# Patient Record
Sex: FEMALE | Race: BLACK OR AFRICAN AMERICAN | Employment: PART TIME | URBAN - METROPOLITAN AREA
[De-identification: names, ages, dates, MRNs, and addresses within clinical notes are randomized per-mention and may not be internally consistent; named-entity substitution may affect disease eponyms.]

---

## 2017-01-26 ENCOUNTER — APPOINTMENT (OUTPATIENT)
Dept: GENERAL RADIOLOGY | Age: 47
DRG: 812 | End: 2017-01-26
Attending: EMERGENCY MEDICINE
Payer: COMMERCIAL

## 2017-01-26 ENCOUNTER — HOSPITAL ENCOUNTER (OUTPATIENT)
Age: 47
Setting detail: OBSERVATION
Discharge: HOME | DRG: 812 | End: 2017-01-27
Attending: EMERGENCY MEDICINE | Admitting: FAMILY MEDICINE
Payer: COMMERCIAL

## 2017-01-26 DIAGNOSIS — N92.1 MENOMETRORRHAGIA: ICD-10-CM

## 2017-01-26 DIAGNOSIS — D50.9 HYPOCHROMIC-MICROCYTIC ANEMIA: ICD-10-CM

## 2017-01-26 DIAGNOSIS — R55 SYNCOPE AND COLLAPSE: Primary | ICD-10-CM

## 2017-01-26 PROBLEM — D64.9 ANEMIA: Status: ACTIVE | Noted: 2017-01-26

## 2017-01-26 LAB
ALBUMIN SERPL BCP-MCNC: 3.8 G/DL (ref 3.5–5)
ALBUMIN/GLOB SERPL: 1 {RATIO} (ref 1.1–2.2)
ALP SERPL-CCNC: 59 U/L (ref 45–117)
ALT SERPL-CCNC: 31 U/L (ref 12–78)
ANION GAP BLD CALC-SCNC: 10 MMOL/L (ref 5–15)
APTT PPP: 22.4 SEC (ref 22.1–32.5)
AST SERPL W P-5'-P-CCNC: 13 U/L (ref 15–37)
BILIRUB SERPL-MCNC: 0.2 MG/DL (ref 0.2–1)
BUN SERPL-MCNC: 13 MG/DL (ref 6–20)
BUN/CREAT SERPL: 17 (ref 12–20)
CALCIUM SERPL-MCNC: 8.2 MG/DL (ref 8.5–10.1)
CHLORIDE SERPL-SCNC: 104 MMOL/L (ref 97–108)
CK MB CFR SERPL CALC: NORMAL % (ref 0–2.5)
CK MB SERPL-MCNC: <1 NG/ML (ref 5–25)
CK SERPL-CCNC: 117 U/L (ref 26–192)
CO2 SERPL-SCNC: 24 MMOL/L (ref 21–32)
CREAT SERPL-MCNC: 0.75 MG/DL (ref 0.55–1.02)
D DIMER PPP FEU-MCNC: 0.25 MG/L FEU (ref 0–0.65)
GLOBULIN SER CALC-MCNC: 3.8 G/DL (ref 2–4)
GLUCOSE SERPL-MCNC: 100 MG/DL (ref 65–100)
INR PPP: 1.1 (ref 0.9–1.1)
POTASSIUM SERPL-SCNC: 3.7 MMOL/L (ref 3.5–5.1)
PROT SERPL-MCNC: 7.6 G/DL (ref 6.4–8.2)
PROTHROMBIN TIME: 11.1 SEC (ref 9–11.1)
SODIUM SERPL-SCNC: 138 MMOL/L (ref 136–145)
THERAPEUTIC RANGE,PTTT: NORMAL SECS (ref 58–77)
TROPONIN I SERPL-MCNC: <0.04 NG/ML

## 2017-01-26 PROCEDURE — 82550 ASSAY OF CK (CPK): CPT | Performed by: EMERGENCY MEDICINE

## 2017-01-26 PROCEDURE — 85610 PROTHROMBIN TIME: CPT | Performed by: EMERGENCY MEDICINE

## 2017-01-26 PROCEDURE — 99285 EMERGENCY DEPT VISIT HI MDM: CPT

## 2017-01-26 PROCEDURE — 94762 N-INVAS EAR/PLS OXIMTRY CONT: CPT

## 2017-01-26 PROCEDURE — 86920 COMPATIBILITY TEST SPIN: CPT | Performed by: EMERGENCY MEDICINE

## 2017-01-26 PROCEDURE — 85379 FIBRIN DEGRADATION QUANT: CPT | Performed by: EMERGENCY MEDICINE

## 2017-01-26 PROCEDURE — 85025 COMPLETE CBC W/AUTO DIFF WBC: CPT | Performed by: EMERGENCY MEDICINE

## 2017-01-26 PROCEDURE — 86900 BLOOD TYPING SEROLOGIC ABO: CPT | Performed by: EMERGENCY MEDICINE

## 2017-01-26 PROCEDURE — 80053 COMPREHEN METABOLIC PANEL: CPT | Performed by: EMERGENCY MEDICINE

## 2017-01-26 PROCEDURE — 71020 XR CHEST PA LAT: CPT

## 2017-01-26 PROCEDURE — 85730 THROMBOPLASTIN TIME PARTIAL: CPT | Performed by: EMERGENCY MEDICINE

## 2017-01-26 PROCEDURE — 93005 ELECTROCARDIOGRAM TRACING: CPT

## 2017-01-26 PROCEDURE — 84484 ASSAY OF TROPONIN QUANT: CPT | Performed by: EMERGENCY MEDICINE

## 2017-01-26 PROCEDURE — 36415 COLL VENOUS BLD VENIPUNCTURE: CPT | Performed by: EMERGENCY MEDICINE

## 2017-01-26 PROCEDURE — 99218 HC RM OBSERVATION: CPT

## 2017-01-26 RX ORDER — SODIUM CHLORIDE 9 MG/ML
250 INJECTION, SOLUTION INTRAVENOUS AS NEEDED
Status: DISCONTINUED | OUTPATIENT
Start: 2017-01-26 | End: 2017-01-27 | Stop reason: HOSPADM

## 2017-01-26 RX ORDER — GUAIFENESIN 100 MG/5ML
324 LIQUID (ML) ORAL
COMMUNITY
End: 2017-01-27

## 2017-01-26 RX ORDER — IBUPROFEN 600 MG/1
600 TABLET ORAL
COMMUNITY
End: 2017-01-27

## 2017-01-26 NOTE — Clinical Note
Patient Class[de-identified] Observation [654] Type of Bed: Telemetry Remote [29] Reason for Observation: anemia Admitting Physician: Yannick Banuelos Attending Physician: Desiree Cordon[address] Diagnosis: anemia

## 2017-01-26 NOTE — IP AVS SNAPSHOT
[address] 
[phone] Patient: Tg Smart MRN: [id number] :1970 You are allergic to the following No active allergies Recent Documentation Height Weight BMI Smoking Status 1.6 m 59 kg 23.03 kg/m2 Never Smoker Unresulted Labs Order Current Status TYPE & CROSSMATCH Preliminary result Emergency Contacts Name Discharge Info Relation Home Work Mobile [facility]     [phone] About your hospitalization You were admitted on:  2017 You last received care in the:  [facility] 4M POST SURG ORT 1 You were discharged on:  2017 Unit phone number:  [phone] Why you were hospitalized Your primary diagnosis was:  Not on File Your diagnoses also included:  Microcytic Anemia, Metrorrhagia, Syncope, Anemia Providers Seen During Your Hospitalizations Provider Role Specialty Primary office phone Gina Kendall MD Attending Provider Emergency Medicine [phone] Nathan Perales MD Attending Provider [facility] [phone] Your Primary Care Physician (PCP) Primary Care Physician Office Phone Office Fax [facility] [phone] Follow-up Information Follow up With Details Comments Contact Info George Currie MD   [address] 
[phone] Current Discharge Medication List  
  
START taking these medications Dose & Instructions Dispensing Information Comments Morning Noon Evening Bedtime  
 ferrous sulfate 325 mg (65 mg iron) tablet Commonly known as:  Iron (Ferrous Sulfate) Your next dose is: Today, Tomorrow Other:  _________ Dose:  325 mg Take 1 Tab by mouth two (2) times a day. Quantity:  60 Tab Refills:  2 STOP taking these medications   
 aspirin 81 mg chewable tablet  
   
  
 ibuprofen 600 mg tablet Commonly known as:  MOTRIN Where to Get Your Medications Information on where to get these meds will be given to you by the nurse or doctor. ! Ask your nurse or doctor about these medications  
  ferrous sulfate 325 mg (65 mg iron) tablet Discharge Instructions Patient Discharge Instructions Shashi Dickson / [id number] : 1970 Admitted 2017 Discharged: 2017 1:50 PM  
 
ACUTE DIAGNOSES: 
anemia Microcytic anemia Anemia CHRONIC MEDICAL DIAGNOSES: 
Problem List as of 2017  Date Reviewed: 2017 Codes Class Noted - Resolved Anemia ICD-10-CM: D64.9 ICD-9-CM: 285.9  2017 - Present Microcytic anemia ICD-10-CM: D50.9 ICD-9-CM: 280.9  2017 - Present Metrorrhagia ICD-10-CM: N92.1 ICD-9-CM: 626.6  2017 - Present Syncope ICD-10-CM: R55 
ICD-9-CM: 780.2  2017 - Present DISCHARGE MEDICATIONS:  
 
 
 
· It is important that you take the medication exactly as they are prescribed. · Keep your medication in the bottles provided by the pharmacist and keep a list of the medication names, dosages, and times to be taken in your wallet. · Do not take other medications without consulting your doctor. DIET:  Regular Diet ACTIVITY: Activity as tolerated ADDITIONAL INFORMATION: If you experience any of the following symptoms then please call your primary care physician or return to the emergency room if you cannot get hold of your doctor: Fever, chills, nausea, vomiting, diarrhea, change in mentation, falling, bleeding, shortness of breath. FOLLOW UP CARE: 
Dr. Nataliya Edwards MD  you are to call and set up an appointment to see them with in 1 week. Follow-up with specialists at directed by them Information obtained by : 
 I understand that if any problems occur once I am at home I am to contact my physician. I understand and acknowledge receipt of the instructions indicated above. Physician's or R.N.'s Signature                                                                  Date/Time Patient or Representative Signature                                                          Date/Time Discharge Orders None [email or web address] Announcement We are excited to announce that we are making your provider's discharge notes available to you in Social [employer]. You will see these notes when they are completed and signed by the physician that discharged you from your recent hospital stay. If you have any questions or concerns about any information you see in Social Shop, please call the Health Information Department where you were seen or reach out to your Primary Care Provider for more information about your plan of care. Introducing hospitals & HEALTH SERVICES! Chelo Velasquez introduces [employer] patient portal. Now you can access parts of your medical record, email your doctor's office, and request medication refills online. 1. In your internet browser, go to [email or web address] 2. Click on the First Time User? Click Here link in the Sign In box. You will see the New Member Sign Up page. 3. Enter your Social Shop Access Code exactly as it appears below. You will not need to use this code after youve completed the sign-up process. If you do not sign up before the expiration date, you must request a new code. · Social [employer] Access Code: [email or web address] Expires: 4/26/2017 10:33 PM 
 
4.  Enter the last four digits of your Social Security Number (xxxx) and Date of Birth (mm/dd/yyyy) as indicated and click Submit. You will be taken to the next sign-up page. 5. Create a SCREEMO ID. This will be your S[employer]EMO login ID and cannot be changed, so think of one that is secure and easy to remember. 6. Create a [employer]O password. You can change your password at any time. 7. Enter your Password Reset Question and Answer. This can be used at a later time if you forget your password. 8. Enter your e-mail address. You will receive e-mail notification when new information is available in [address] 9. Click Sign Up. You can now view and download portions of your medical record. 10. Click the Download Summary menu link to download a portable copy of your medical information. If you have questions, please visit the Frequently Asked Questions section of the [employer] website. Remember, S[employer]O is NOT to be used for urgent needs. For medical emergencies, dial 911. Now available from your iPhone and Android! General Information Please provide this summary of care documentation to your next provider. Patient Signature:  ____________________________________________________________ Date:  ____________________________________________________________  
  
Leocadia Nim Provider Signature:  ____________________________________________________________ Date:  ____________________________________________________________

## 2017-01-27 VITALS
HEART RATE: 69 BPM | SYSTOLIC BLOOD PRESSURE: 113 MMHG | BODY MASS INDEX: 23.04 KG/M2 | HEIGHT: 63 IN | OXYGEN SATURATION: 100 % | DIASTOLIC BLOOD PRESSURE: 70 MMHG | TEMPERATURE: 98.6 F | WEIGHT: 130 LBS | RESPIRATION RATE: 18 BRPM

## 2017-01-27 PROBLEM — D64.9 ANEMIA: Status: ACTIVE | Noted: 2017-01-27

## 2017-01-27 LAB
ANION GAP BLD CALC-SCNC: 9 MMOL/L (ref 5–15)
ATRIAL RATE: 81 BPM
BASOPHILS # BLD AUTO: 0.1 K/UL (ref 0–0.1)
BASOPHILS # BLD: 1 % (ref 0–1)
BUN SERPL-MCNC: 11 MG/DL (ref 6–20)
BUN/CREAT SERPL: 18 (ref 12–20)
CALCIUM SERPL-MCNC: 8.1 MG/DL (ref 8.5–10.1)
CALCULATED P AXIS, ECG09: -18 DEGREES
CALCULATED R AXIS, ECG10: 30 DEGREES
CALCULATED T AXIS, ECG11: 29 DEGREES
CHLORIDE SERPL-SCNC: 109 MMOL/L (ref 97–108)
CO2 SERPL-SCNC: 23 MMOL/L (ref 21–32)
CREAT SERPL-MCNC: 0.62 MG/DL (ref 0.55–1.02)
DIAGNOSIS, 93000: NORMAL
EOSINOPHIL # BLD: 0.1 K/UL (ref 0–0.4)
EOSINOPHIL NFR BLD: 1 % (ref 0–7)
ERYTHROCYTE [DISTWIDTH] IN BLOOD BY AUTOMATED COUNT: 16.3 % (ref 11.5–14.5)
ERYTHROCYTE [DISTWIDTH] IN BLOOD BY AUTOMATED COUNT: 17.8 % (ref 11.5–14.5)
GLUCOSE SERPL-MCNC: 98 MG/DL (ref 65–100)
HCT VFR BLD AUTO: 19.9 % (ref 35–47)
HCT VFR BLD AUTO: 22 % (ref 35–47)
HGB BLD-MCNC: 5.8 G/DL (ref 11.5–16)
HGB BLD-MCNC: 6.5 G/DL (ref 11.5–16)
HGB BLD-MCNC: 9.4 G/DL (ref 11.5–16)
LYMPHOCYTES # BLD AUTO: 18 % (ref 12–49)
LYMPHOCYTES # BLD: 1.6 K/UL (ref 0.8–3.5)
MCH RBC QN AUTO: 18.8 PG (ref 26–34)
MCH RBC QN AUTO: 19.7 PG (ref 26–34)
MCHC RBC AUTO-ENTMCNC: 29.1 G/DL (ref 30–36.5)
MCHC RBC AUTO-ENTMCNC: 29.5 G/DL (ref 30–36.5)
MCV RBC AUTO: 64.4 FL (ref 80–99)
MCV RBC AUTO: 66.7 FL (ref 80–99)
MONOCYTES # BLD: 0.7 K/UL (ref 0–1)
MONOCYTES NFR BLD AUTO: 8 % (ref 5–13)
NEUTS SEG # BLD: 6.5 K/UL (ref 1.8–8)
NEUTS SEG NFR BLD AUTO: 72 % (ref 32–75)
P-R INTERVAL, ECG05: 142 MS
PATH REV BLD -IMP: ABNORMAL
PLATELET # BLD AUTO: 230 K/UL (ref 150–400)
PLATELET # BLD AUTO: 236 K/UL (ref 150–400)
POTASSIUM SERPL-SCNC: 3.8 MMOL/L (ref 3.5–5.1)
Q-T INTERVAL, ECG07: 378 MS
QRS DURATION, ECG06: 82 MS
QTC CALCULATION (BEZET), ECG08: 439 MS
RBC # BLD AUTO: 3.09 M/UL (ref 3.8–5.2)
RBC # BLD AUTO: 3.3 M/UL (ref 3.8–5.2)
RBC MORPH BLD: ABNORMAL
SODIUM SERPL-SCNC: 141 MMOL/L (ref 136–145)
VENTRICULAR RATE, ECG03: 81 BPM
WBC # BLD AUTO: 7.7 K/UL (ref 3.6–11)
WBC # BLD AUTO: 9 K/UL (ref 3.6–11)
WBC MORPH BLD: ABNORMAL

## 2017-01-27 PROCEDURE — 36415 COLL VENOUS BLD VENIPUNCTURE: CPT | Performed by: FAMILY MEDICINE

## 2017-01-27 PROCEDURE — 99218 HC RM OBSERVATION: CPT

## 2017-01-27 PROCEDURE — 65660000000 HC RM CCU STEPDOWN

## 2017-01-27 PROCEDURE — P9016 RBC LEUKOCYTES REDUCED: HCPCS | Performed by: EMERGENCY MEDICINE

## 2017-01-27 PROCEDURE — 85018 HEMOGLOBIN: CPT | Performed by: FAMILY MEDICINE

## 2017-01-27 PROCEDURE — 74011250636 HC RX REV CODE- 250/636: Performed by: INTERNAL MEDICINE

## 2017-01-27 PROCEDURE — 80048 BASIC METABOLIC PNL TOTAL CA: CPT | Performed by: INTERNAL MEDICINE

## 2017-01-27 PROCEDURE — 36430 TRANSFUSION BLD/BLD COMPNT: CPT

## 2017-01-27 PROCEDURE — 74011250636 HC RX REV CODE- 250/636: Performed by: EMERGENCY MEDICINE

## 2017-01-27 PROCEDURE — 30233N1 TRANSFUSION OF NONAUTOLOGOUS RED BLOOD CELLS INTO PERIPHERAL VEIN, PERCUTANEOUS APPROACH: ICD-10-PCS | Performed by: FAMILY MEDICINE

## 2017-01-27 PROCEDURE — 77030029131 HC ADMN ST IV BLD N DEHP ICUM -B

## 2017-01-27 PROCEDURE — 85027 COMPLETE CBC AUTOMATED: CPT | Performed by: INTERNAL MEDICINE

## 2017-01-27 RX ORDER — SODIUM CHLORIDE 0.9 % (FLUSH) 0.9 %
5-10 SYRINGE (ML) INJECTION AS NEEDED
Status: DISCONTINUED | OUTPATIENT
Start: 2017-01-27 | End: 2017-01-27 | Stop reason: HOSPADM

## 2017-01-27 RX ORDER — SODIUM CHLORIDE 0.9 % (FLUSH) 0.9 %
5-10 SYRINGE (ML) INJECTION EVERY 8 HOURS
Status: DISCONTINUED | OUTPATIENT
Start: 2017-01-27 | End: 2017-01-27 | Stop reason: HOSPADM

## 2017-01-27 RX ORDER — SODIUM CHLORIDE 9 MG/ML
75 INJECTION, SOLUTION INTRAVENOUS CONTINUOUS
Status: DISCONTINUED | OUTPATIENT
Start: 2017-01-27 | End: 2017-01-27 | Stop reason: HOSPADM

## 2017-01-27 RX ORDER — LANOLIN ALCOHOL/MO/W.PET/CERES
325 CREAM (GRAM) TOPICAL 2 TIMES DAILY
Qty: 60 TAB | Refills: 2 | Status: SHIPPED | OUTPATIENT
Start: 2017-01-27

## 2017-01-27 RX ORDER — GUAIFENESIN 100 MG/5ML
324 LIQUID (ML) ORAL
Status: DISCONTINUED | OUTPATIENT
Start: 2017-01-27 | End: 2017-01-27 | Stop reason: HOSPADM

## 2017-01-27 RX ADMIN — SODIUM CHLORIDE 250 ML: 900 INJECTION, SOLUTION INTRAVENOUS at 01:18

## 2017-01-27 RX ADMIN — SODIUM CHLORIDE 75 ML/HR: 900 INJECTION, SOLUTION INTRAVENOUS at 11:33

## 2017-01-27 RX ADMIN — Medication 10 ML: at 05:11

## 2017-01-27 NOTE — PROGRESS NOTES
[facility]: MED RECONCILIATION    Comments/Recommendations  Patient stated that she would alternate ibuprofen and aspirin for pain, would not take on the same day. She also stated she was taking an iron supplement at one time, but has not taken in a long time. Counseled patient that she should not take Ibuprofen and regular strength aspirin equivalents together (4 tabs of 81mg). Acetaminophen is an alternative that can be used for pain management without the increased risk of ulcer development. Medications added:     · Aspirin PRN  · Ibuprofen PRN    Medications removed:    · None    Medications adjusted:    · None    Information obtained from:   Patient    Significant PMH/Disease States: There is no problem list on file for this patient. Past Medical History   Diagnosis Date    Ill-defined condition      anemia     Chief Complaint for this Admission:   Chief Complaint   Patient presents with    Syncope     Allergies: Review of patient's allergies indicates no known allergies. Prior to Admission Medications:   Prior to Admission Medications   Prescriptions Last Dose Informant Patient Reported? Taking?   aspirin 81 mg chewable tablet 1/19/2017 at Unknown time  Yes Yes   Sig: Take 324 mg by mouth daily as needed for Pain. ibuprofen (MOTRIN) 600 mg tablet 1/26/2017 at AM  Yes Yes   Sig: Take 600 mg by mouth every eight (8) hours as needed for Pain.       Facility-Administered Medications: None     Milana Danielson, PharmD, BCPS  Contact:

## 2017-01-27 NOTE — PROGRESS NOTES
TRANSFER - IN REPORT:    Verbal report received from YOUNG Gamino(name) on [facility]  being received from ED(unit) for routine progression of care      Report consisted of patients Situation, Background, Assessment and   Recommendations(SBAR). Information from the following report(s) SBAR, Kardex and MAR was reviewed with the receiving nurse. Opportunity for questions and clarification was provided. Assessment completed upon patients arrival to unit and care assumed.     Primary Nurse Gerry Johnson RN and YOUNG Mckee performed a dual skin assessment on this patient No impairment noted  Tod score is 22

## 2017-01-27 NOTE — PROGRESS NOTES
Daily Progress Note: 1/27/2017  Jing Rutherford MD    Assessment/Plan:   1. Microcytic anemia (1/26/2017). Symptomatic. This is likely secondary to heavy menstruation.  -----Admit to remote tele under observation.   -----Transfusing 3 units of PRBC.   -----Home on iron      2. Menometrorrhagia (1/26/2017)/ DUB.   -----Pt has outpatient GYN. She refused her to be seen by GYN and wants to FU with GYN soon. -----She will see Dr Stroud Client next week (GYN)      3. Syncope (1/26/2017)/ dizziness/ lightheadedness. This is likely secondary to severe anemia. -----Continue to monitor on remote telemetry. Problem List:  Problem List as of 1/27/2017  Date Reviewed: 1/26/2017          Codes Class Noted - Resolved    Microcytic anemia ICD-10-CM: D50.9  ICD-9-CM: 280.9  1/26/2017 - Present        Metrorrhagia ICD-10-CM: N92.1  ICD-9-CM: 626.6  1/26/2017 - Present        Syncope ICD-10-CM: R55  ICD-9-CM: 780.2  1/26/2017 - Present              HPI:   Ms. Mallory Fields is a 42 y.o.  female who is admitted with severe anemia. Ms. Mallory Fields with PMH of metrorrhagia and anemia who presented to ER c/o dizziness, lightheadedness and passing out. Pt has been feeling SOB, dizziness and lightheadedness for sometime, but today she was at the GYM on the treadmill exercising. 15 minutes into her exercise , she started to feel lightheaded, dizzy and SOB. She stopped exercising and sat down and later she passed out. Elizabeth was by her side who witnessed this. Denies seizure like activity, bladder or bowel incontinence. Has Hx of anemia secondary to heavy and frequent mensuration. She was on iron supplement, which she stopped taking. (Dr Freed)    1/27: She is receiving blood now and tolerating well. She sees Dr Stroud Client and was advised to have IUD but she declined this as she was concerned about the hormonal content. She will schedule appt to see her next week.  Hope to able to send her home after transfusion is complete.         Review of Systems:   A comprehensive review of systems was negative except for that written in the HPI. Objective:   Physical Exam:     Visit Vitals    /77 (BP 1 Location: Left arm, BP Patient Position: At rest)    Pulse 76    Temp 99.2 °F (37.3 °C)    Resp 18    Ht 5' 3\" (1.6 m)    Wt 130 lb (59 kg)    SpO2 100%    BMI 23.03 kg/m2      O2 Device: Room air    Temp (24hrs), Av.9 °F (37.2 °C), Min:98.1 °F (36.7 °C), Max:99.4 °F (37.4 °C)             General:  Alert, cooperative, no distress, appears stated age. Head:  Normocephalic, without obvious abnormality, atraumatic. Eyes:  Conjunctivae/corneas clear. PERRL, EOMs intact. Nose: Nares normal. Septum midline. Mucosa normal. No drainage or sinus tenderness. Throat: Lips, mucosa, and tongue normal. Teeth and gums normal.   Neck: Supple, symmetrical, trachea midline, no adenopathy, thyroid: no enlargement/tenderness/nodules, no carotid bruit and no JVD. Lungs:   Clear to auscultation bilaterally. Heart:  Regular rate and rhythm, S1, S2 normal, grade ll/Vl  Murmur present at left upper border, no click, rub or gallop. Abdomen:   Soft, non-tender. Bowel sounds normal. No masses,  No organomegaly. Extremities: Extremities normal, atraumatic, no cyanosis or edema. No calf tenderness or cords. Pulses: 2+ and symmetric all extremities. Skin: Skin color, texture, turgor normal. No rashes or lesions   Neurologic: CNII-XII intact. Alert and oriented X 3. Fine motor of hands and fingers normal.   equal.  No cogwheeling or rigidity. Gait not tested at this time. Sensation grossly normal to touch.   Gross motor of extremities normal.       Data Review:       Recent Days:  Recent Labs      17   WBC  7.7  9.0   HGB  6.5*  5.8*   HCT  22.0*  19.9*   PLT  230  236     Recent Labs      17   NA  141  138   K  3.8  3.7   CL  109*  104   CO2  23  24   GLU  98 100   BUN  11  13   CREA  0.62  0.75   CA  8.1*  8.2*   ALB   --   3.8   TBILI   --   0.2   SGOT   --   13*   ALT   --   31   INR   --   1.1       24 Hour Results:  Recent Results (from the past 24 hour(s))   EKG, 12 LEAD, INITIAL    Collection Time: 01/26/17  9:45 PM   Result Value Ref Range    Ventricular Rate 81 BPM    Atrial Rate 81 BPM    P-R Interval 142 ms    QRS Duration 82 ms    Q-T Interval 378 ms    QTC Calculation (Bezet) 439 ms    Calculated P Axis -18 degrees    Calculated R Axis 30 degrees    Calculated T Axis 29 degrees    Diagnosis       Normal sinus rhythm  Normal ECG  No previous ECGs available     CBC WITH AUTOMATED DIFF    Collection Time: 01/26/17 10:17 PM   Result Value Ref Range    WBC 9.0 3.6 - 11.0 K/uL    RBC 3.09 (L) 3.80 - 5.20 M/uL    HGB 5.8 (LL) 11.5 - 16.0 g/dL    HCT 19.9 (L) 35.0 - 47.0 %    MCV 64.4 (L) 80.0 - 99.0 FL    MCH 18.8 (L) 26.0 - 34.0 PG    MCHC 29.1 (L) 30.0 - 36.5 g/dL    RDW 16.3 (H) 11.5 - 14.5 %    PLATELET 616 627 - 609 K/uL    NEUTROPHILS 72 32 - 75 %    LYMPHOCYTES 18 12 - 49 %    MONOCYTES 8 5 - 13 %    EOSINOPHILS 1 0 - 7 %    BASOPHILS 1 0 - 1 %    ABS. NEUTROPHILS 6.5 1.8 - 8.0 K/UL    ABS. LYMPHOCYTES 1.6 0.8 - 3.5 K/UL    ABS. MONOCYTES 0.7 0.0 - 1.0 K/UL    ABS. EOSINOPHILS 0.1 0.0 - 0.4 K/UL    ABS.  BASOPHILS 0.1 0.0 - 0.1 K/UL    RBC COMMENTS MICROCYTOSIS  2+        RBC COMMENTS ANISOCYTOSIS  1+        RBC COMMENTS POIKILOCYTOSIS  PRESENT        RBC COMMENTS HYPOCHROMIA  2+        WBC COMMENTS DIFF WAS SENT TO PATHOLOGIST REVIEW    CK W/ CKMB & INDEX    Collection Time: 01/26/17 10:17 PM   Result Value Ref Range     26 - 192 U/L    CK - MB <1.0 <3.6 NG/ML    CK-MB Index CANNOT BE CALCULATED 0 - 2.5     METABOLIC PANEL, COMPREHENSIVE    Collection Time: 01/26/17 10:17 PM   Result Value Ref Range    Sodium 138 136 - 145 mmol/L    Potassium 3.7 3.5 - 5.1 mmol/L    Chloride 104 97 - 108 mmol/L    CO2 24 21 - 32 mmol/L    Anion gap 10 5 - 15 mmol/L Glucose 100 65 - 100 mg/dL    BUN 13 6 - 20 MG/DL    Creatinine 0.75 0.55 - 1.02 MG/DL    BUN/Creatinine ratio 17 12 - 20      GFR est AA >60 >60 ml/min/1.73m2    GFR est non-AA >60 >60 ml/min/1.73m2    Calcium 8.2 (L) 8.5 - 10.1 MG/DL    Bilirubin, total 0.2 0.2 - 1.0 MG/DL    ALT 31 12 - 78 U/L    AST 13 (L) 15 - 37 U/L    Alk.  phosphatase 59 45 - 117 U/L    Protein, total 7.6 6.4 - 8.2 g/dL    Albumin 3.8 3.5 - 5.0 g/dL    Globulin 3.8 2.0 - 4.0 g/dL    A-G Ratio 1.0 (L) 1.1 - 2.2     D DIMER    Collection Time: 01/26/17 10:17 PM   Result Value Ref Range    D-dimer 0.25 0.00 - 0.65 mg/L FEU   PROTHROMBIN TIME + INR    Collection Time: 01/26/17 10:17 PM   Result Value Ref Range    INR 1.1 0.9 - 1.1      Prothrombin time 11.1 9.0 - 11.1 sec   PTT    Collection Time: 01/26/17 10:17 PM   Result Value Ref Range    aPTT 22.4 22.1 - 32.5 sec    aPTT, therapeutic range     58.0 - 77.0 SECS   TROPONIN I    Collection Time: 01/26/17 10:17 PM   Result Value Ref Range    Troponin-I, Qt. <0.04 <0.05 ng/mL   TYPE & CROSSMATCH    Collection Time: 01/26/17 11:01 PM   Result Value Ref Range    Crossmatch Expiration 01/29/2017     ABO/Rh(D) O POSITIVE     Antibody screen NEG     Unit number [id number]     Blood component type RC LR AS1     Unit division 00     Status of unit ALLOCATED     Crossmatch result Compatible     Unit number [id number]     Blood component type RC LR AS1     Unit division 00     Status of unit ISSUED     Crossmatch result Compatible     Unit number [id number]     Blood component type RC LR AS3     Unit division 00     Status of unit ISSUED     Crossmatch result Compatible    METABOLIC PANEL, BASIC    Collection Time: 01/27/17  4:52 AM   Result Value Ref Range    Sodium 141 136 - 145 mmol/L    Potassium 3.8 3.5 - 5.1 mmol/L    Chloride 109 (H) 97 - 108 mmol/L    CO2 23 21 - 32 mmol/L    Anion gap 9 5 - 15 mmol/L    Glucose 98 65 - 100 mg/dL    BUN 11 6 - 20 MG/DL    Creatinine 0.62 0.55 - 1.02 MG/DL BUN/Creatinine ratio 18 12 - 20      GFR est AA >60 >60 ml/min/1.73m2    GFR est non-AA >60 >60 ml/min/1.73m2    Calcium 8.1 (L) 8.5 - 10.1 MG/DL   CBC W/O DIFF    Collection Time: 01/27/17  4:52 AM   Result Value Ref Range    WBC 7.7 3.6 - 11.0 K/uL    RBC 3.30 (L) 3.80 - 5.20 M/uL    HGB 6.5 (L) 11.5 - 16.0 g/dL    HCT 22.0 (L) 35.0 - 47.0 %    MCV 66.7 (L) 80.0 - 99.0 FL    MCH 19.7 (L) 26.0 - 34.0 PG    MCHC 29.5 (L) 30.0 - 36.5 g/dL    RDW 17.8 (H) 11.5 - 14.5 %    PLATELET 371 510 - 274 K/uL       Medications reviewed  Current Facility-Administered Medications   Medication Dose Route Frequency    aspirin chewable tablet 324 mg  324 mg Oral DAILY PRN    sodium chloride (NS) flush 5-10 mL  5-10 mL IntraVENous Q8H    sodium chloride (NS) flush 5-10 mL  5-10 mL IntraVENous PRN    0.9% sodium chloride infusion  75 mL/hr IntraVENous CONTINUOUS    0.9% sodium chloride infusion 250 mL  250 mL IntraVENous PRN       Care Plan discussed with: Patient/Family    Total time spent with patient and review of records: 30 minutes.     Marilee Pinto MD

## 2017-01-27 NOTE — DISCHARGE INSTRUCTIONS
Patient Discharge Instructions    Megan Gallagher / [id number] : 1970    Admitted 2017 Discharged: 2017 1:50 PM     ACUTE DIAGNOSES:  anemia  Microcytic anemia  Anemia    CHRONIC MEDICAL DIAGNOSES:  Problem List as of 2017  Date Reviewed: 2017          Codes Class Noted - Resolved    Anemia ICD-10-CM: D64.9  ICD-9-CM: 285.9  2017 - Present        Microcytic anemia ICD-10-CM: D50.9  ICD-9-CM: 280.9  2017 - Present        Metrorrhagia ICD-10-CM: N92.1  ICD-9-CM: 626.6  2017 - Present        Syncope ICD-10-CM: R55  ICD-9-CM: 780.2  2017 - Present              DISCHARGE MEDICATIONS:         · It is important that you take the medication exactly as they are prescribed. · Keep your medication in the bottles provided by the pharmacist and keep a list of the medication names, dosages, and times to be taken in your wallet. · Do not take other medications without consulting your doctor. DIET:  Regular Diet    ACTIVITY: Activity as tolerated    ADDITIONAL INFORMATION: If you experience any of the following symptoms then please call your primary care physician or return to the emergency room if you cannot get hold of your doctor: Fever, chills, nausea, vomiting, diarrhea, change in mentation, falling, bleeding, shortness of breath. FOLLOW UP CARE:  Dr. Jumana Bean MD  you are to call and set up an appointment to see them with in 1 week. Follow-up with specialists at directed by them      Information obtained by :  I understand that if any problems occur once I am at home I am to contact my physician. I understand and acknowledge receipt of the instructions indicated above.                                                                                                                                            Physician's or R.N.'s Signature                                                                  Date/Time Patient or Representative Signature                                                          Date/Time

## 2017-01-27 NOTE — ED PROVIDER NOTES
HPI Comments: 42 y.o. female with past medical history significant for anemia who presents from gym with chief complaint of syncope. Pt reports syncopal episode while at the gym tonight. She reports ambulating on treadmill for 15 minutes when she acutely \"didn't feel right\". She went to sit down, drank water. Per spouse, \"her eyes rolled back and her body went limp\" x 1minute. Pt states she does not remember episode. While in ED she feels better. Pt also reports 1 week hx of YU with palpitations while ambulating up steps. Pt report hx of anemia as well as moderate to severe bleeding during menstrual cycle. Pt deneis recent travel. Pt also denies chest pain, SOB, chest tightness, abdominal pain, nausea, vomiting, diarrhea, constipation, blood in stool, neck pain, back pain, hematemesis, headache, numbnes, tingling, dizziness or lightheadedness. There are no other acute medical concerns at this time. Social hx: Non-smoker. - EtOH use. PCP: Claude Massing, MD    Note written by Tavares Becerra, as dictated by Vijay Sofia MD 9:52 PM    The history is provided by the patient. No  was used. No past medical history on file. No past surgical history on file. No family history on file. Social History     Social History    Marital status: N/A     Spouse name: N/A    Number of children: N/A    Years of education: N/A     Occupational History    Not on file. Social History Main Topics    Smoking status: Not on file    Smokeless tobacco: Not on file    Alcohol use Not on file    Drug use: Not on file    Sexual activity: Not on file     Other Topics Concern    Not on file     Social History Narrative         ALLERGIES: Review of patient's allergies indicates no known allergies. Review of Systems   Constitutional: Negative for fever. HENT: Negative for congestion. Respiratory: Negative for cough, chest tightness and shortness of breath. Cardiovascular: Negative for chest pain. Gastrointestinal: Negative for abdominal pain, blood in stool, constipation, diarrhea, nausea and vomiting. Genitourinary: Negative for difficulty urinating and dysuria. Musculoskeletal: Negative for back pain and neck pain. Neurological: Positive for syncope. Negative for dizziness, light-headedness and headaches. All other systems reviewed and are negative. Vitals:    01/26/17 2140   BP: 134/73   Pulse: 90   Resp: 17   Temp: 98.1 °F (36.7 °C)   SpO2: 100%   Weight: 59 kg (130 lb)   Height: 5' 3\" (1.6 m)            Physical Exam   Nursing note and vitals reviewed. CONSTITUTIONAL: Well-appearing; well-nourished; in no apparent distress  HEAD: Normocephalic; atraumatic  EYES: PERRL; EOM intact; conjunctiva and sclera are clear bilaterally. ENT: No rhinorrhea; normal pharynx with no tonsillar hypertrophy; mucous membranes pink/moist, no erythema, no exudate. NECK: Supple; non-tender; no cervical lymphadenopathy  CARD: Normal S1, S2; no murmurs, rubs, or gallops. Regular rate and rhythm. RESP: Normal respiratory effort; breath sounds clear and equal bilaterally; no wheezes, rhonchi, or rales. ABD: Normal bowel sounds; non-distended; non-tender; no palpable organomegaly, no masses, no bruits. Back Exam: Normal inspection; no vertebral point tenderness, no CVA tenderness. Normal range of motion. EXT: Normal ROM in all four extremities; non-tender to palpation; no swelling or deformity; distal pulses are normal, no edema. SKIN: Warm; dry; no rash. NEURO:Alert and oriented x 3, coherent, LISBET-XII grossly intact, sensory and motor are non-focal.        MDM  Number of Diagnoses or Management Options  Menometrorrhagia:   Syncope and collapse:   Diagnosis management comments: Assessment: 42 year female with syncope after exercising this evening. The patient also has a history of anemia, secondary to menometrorrhagia.  She was supposed to be on iron supplements that she is to be taken for a while. Differential diagnosis included ACS, electrolyte abnormality, anemia, and venous thromboembolism. Plan: Lab/ EKG/ chest x-ray/ IV fluid/ orthostatic/ serial exam/ consult the hospitalist/ monitor and reevaluate         Amount and/or Complexity of Data Reviewed  Clinical lab tests: ordered and reviewed  Tests in the radiology section of CPT®: ordered and reviewed  Tests in the medicine section of CPT®: reviewed and ordered  Discussion of test results with the performing providers: yes  Decide to obtain previous medical records or to obtain history from someone other than the patient: yes  Obtain history from someone other than the patient: yes  Review and summarize past medical records: yes  Discuss the patient with other providers: yes  Independent visualization of images, tracings, or specimens: yes    Risk of Complications, Morbidity, and/or Mortality  Presenting problems: moderate  Diagnostic procedures: moderate  Management options: moderate    Critical Care  Total time providing critical care: (Critical care time performed at the bedside, outside of billable procedure is 35 minutes.)    Patient Progress  Patient progress: stable    ED Course       Procedures     ED EKG interpretation:  Rhythm: normal sinus rhythm; and regular . Rate (approx.): 81; Axis: normal; P wave: normal; QRS interval: normal ; ST/T wave: non-specific changes; in  Lead: Diffusely; Other findings: borderline ekg. This EKG was interpreted by Raj Weber MD,ED Provider. XRAY INTERPRETATION (ED MD)  Chest Xray  No acute process seen. Normal heart size. No bony abnormalities. No infiltrate. Raj Weber MD 9:57 PM    PROGRESS NOTE:  Pt has been reexamined by Raj Weber MD all available results have been reviewed with pt and any available family. Pt understands sx, dx, and tx in ED. Care plan has been outlined and questions have been answered.  Pt and any available family understands and agrees to need for admission to hospital for further tx not available in ED. Pt is ready for admission. Written by iM Boston MD,  11:26 PM    CONSULT NOTE:  Mi Boston MD spoke with Dr. Alejandra Cunningham of the adult hospitalist team. Discussed patient's presentation, history, physical assessment, and available diagnostic results.  He will evaluate, write orders and admit the patient to the hospital. 11:26 PM    .

## 2017-01-27 NOTE — DISCHARGE SUMMARY
Physician Discharge Summary     Patient ID:    Dottie Vargas  [id number]  42 y.o.  1970  Giuliano Contreras MD    Admit date: 1/26/2017    Discharge date and time: 1/27/2017    Admission Diagnoses: anemia;Microcytic anemia; Anemia    Chronic Diagnoses:    Problem List as of 1/27/2017  Date Reviewed: 1/26/2017          Codes Class Noted - Resolved    Anemia ICD-10-CM: D64.9  ICD-9-CM: 285.9  1/27/2017 - Present        Microcytic anemia ICD-10-CM: D50.9  ICD-9-CM: 280.9  1/26/2017 - Present        Metrorrhagia ICD-10-CM: N92.1  ICD-9-CM: 626.6  1/26/2017 - Present        Syncope ICD-10-CM: R55  ICD-9-CM: 780.2  1/26/2017 - Present              Discharge Medications:   Current Discharge Medication List      START taking these medications    Details   ferrous sulfate (IRON, FERROUS SULFATE,) 325 mg (65 mg iron) tablet Take 1 Tab by mouth two (2) times a day. Qty: 60 Tab, Refills: 2         STOP taking these medications       ibuprofen (MOTRIN) 600 mg tablet Comments:   Reason for Stopping:         aspirin 81 mg chewable tablet Comments:   Reason for Stopping: Follow up Care:    1. Giuliano Contreras MD with in 1 weeks  2. OB/GYN  specialists as directed. Diet:  Regular Diet    Disposition:  Home.     Advanced Directive:    Discharge Exam:  [See today's progress note.]    CONSULTATIONS: None    Significant Diagnostic Studies:   Recent Labs      01/27/17   1232  01/27/17 0452 01/26/17 2217   WBC   --   7.7  9.0   HGB  9.4*  6.5*  5.8*   HCT   --   22.0*  19.9*   PLT   --   230  236     Recent Labs      01/27/17 0452  01/26/17 2217   NA  141  138   K  3.8  3.7   CL  109*  104   CO2  23  24   BUN  11  13   CREA  0.62  0.75   GLU  98  100   CA  8.1*  8.2*     Recent Labs      01/26/17 2217   SGOT  13*   ALT  31   AP  59   TBILI  0.2   TP  7.6   ALB  3.8   GLOB  3.8     Recent Labs      01/26/17 2217   INR  1.1   PTP  11.1   APTT  22.4        No results for input(s): PH, PCO2, PO2 in the last 72 hours. Recent Labs      01/26/17   2217   CPK  117   CKMB  <1.0             HOSPITAL COURSE & TREATMENT RENDERED:   1. Microcytic anemia (1/26/2017). Symptomatic. This is likely secondary to heavy menstruation. -----Admit to remote tele under observation.   -----Transfusing 3 units of PRBC.   -----Home on iron       2. Menometrorrhagia (1/26/2017)/ DUB.   -----Pt has outpatient GYN. She refused her to be seen by GYN and wants to FU with GYN soon. -----She will see Dr Areatha Mcardle next week (GYN)       3. Syncope (1/26/2017)/ dizziness/ lightheadedness. This is likely secondary to severe anemia. -----Continue to monitor on remote telemetry.       Problem List:  Problem List as of 1/27/2017  Date Reviewed: 1/26/2017             Codes Class Noted - Resolved     Microcytic anemia ICD-10-CM: D50.9  ICD-9-CM: 082. 9   1/26/2017 - Present           Metrorrhagia ICD-10-CM: N92.1  ICD-9-CM: 626.6   1/26/2017 - Present           Syncope ICD-10-CM: R55  ICD-9-CM: 780. 2   1/26/2017 - Present                  HPI:   Ms. Liya Bruno is a 42 y.o.  female who is admitted with severe anemia. Ms. Liya Bruno with PMH of metrorrhagia and anemia who presented to ER c/o dizziness, lightheadedness and passing out. Pt has been feeling SOB, dizziness and lightheadedness for sometime, but today she was at the GYM on the treadmill exercising. 15 minutes into her exercise , she started to feel lightheaded, dizzy and SOB. She stopped exercising and sat down and later she passed out. Elizabeth was by her side who witnessed this. Denies seizure like activity, bladder or bowel incontinence. Has Hx of anemia secondary to heavy and frequent mensuration. She was on iron supplement, which she stopped taking. (Dr Rae Downey)     1/27: She is receiving blood now and tolerating well. She sees Dr Areatha Mcardle and was advised to have IUD but she declined this as she was concerned about the hormonal content. She will schedule appt to see her next week.  Hope to able to send her home after transfusion is complete.          Review of Systems:   A comprehensive review of systems was negative except for that written in the HPI.     Objective:   Physical Exam:           Visit Vitals    /77 (BP 1 Location: Left arm, BP Patient Position: At rest)    Pulse 76    Temp 99.2 °F (37.3 °C)    Resp 18    Ht 5' 3\" (1.6 m)    Wt 130 lb (59 kg)    SpO2 100%    BMI 23.03 kg/m2      O2 Device: Room air     Temp (24hrs), Av.9 °F (37.2 °C), Min:98.1 °F (36.7 °C), Max:99.4 °F (37.4 °C)           General:  Alert, cooperative, no distress, appears stated age. Head:  Normocephalic, without obvious abnormality, atraumatic. Eyes:  Conjunctivae/corneas clear. PERRL, EOMs intact. Nose: Nares normal. Septum midline. Mucosa normal. No drainage or sinus tenderness. Throat: Lips, mucosa, and tongue normal. Teeth and gums normal.   Neck: Supple, symmetrical, trachea midline, no adenopathy, thyroid: no enlargement/tenderness/nodules, no carotid bruit and no JVD. Lungs:  Clear to auscultation bilaterally. Heart:  Regular rate and rhythm, S1, S2 normal, grade ll/Vl  Murmur present at left upper border, no click, rub or gallop. Abdomen:  Soft, non-tender. Bowel sounds normal. No masses, No organomegaly. Extremities: Extremities normal, atraumatic, no cyanosis or edema. No calf tenderness or cords. Pulses: 2+ and symmetric all extremities. Skin: Skin color, texture, turgor normal. No rashes or lesions   Neurologic: CNII-XII intact. Alert and oriented X 3. Fine motor of hands and fingers normal.  equal. No cogwheeling or rigidity. Gait not tested at this time. Sensation grossly normal to touch.  Gross motor of extremities normal.       Data Review:       Recent Days:       Recent Labs      17   WBC 7.7 9.0   HGB 6.5* 5.8*   HCT 22.0* 19.9*    236           Recent Labs      17    138   K 3.8 3.7   * 104   CO2 23 24   GLU 98 100   BUN 11 13   CREA 0.62 0.75   CA 8.1* 8.2*   ALB --  3.8   TBILI --  0.2   SGOT --  13*   ALT --  31   INR --  1.1     Discharge Hb is 9      Signed:  Marilee Pinto MD  1/27/2017  1:50 PM

## 2017-01-27 NOTE — PROGRESS NOTES
Bedside shift change report given to Meenakshi Schroeder (oncoming nurse) by Jv Aiken (offgoing nurse). Report included the following information SBAR, Kardex and Recent Results.

## 2017-01-27 NOTE — H&P
[address]  [phone]    Admission History and Physical      NAME:  Prieto Ford   :   1970   MRN:  [id number]     PCP:  Dianelys Rain MD     Date/Time:  2017         Subjective:     CHIEF COMPLAINT: dizziness, syncope     HISTORY OF PRESENT ILLNESS:     Ms. Carmelo Gilmore is a 42 y.o.  female who is admitted with severe anemia. Ms. Carmelo Gilmore with PMH of metrorrhagia and anemia who presented to ER c/o dizziness, lightheadedness and passing out. Pt has been feeling SOB, dizziness and lightheadedness for sometime, but today she was at the GYM on the treadmill exercising. 15 minutes into her exercise , she started to feel lightheaded, dizzy and SOB. She stopped exercising and sat down and later she passed out. Elizabeth was by her side who witnessed this. Denies seizure like activity, bladder or bowel incontinence. Has Hx of anemia secondary to heavy and frequent mensuration. She was on iron supplement, which she stopped taking. Past Medical History   Diagnosis Date    Ill-defined condition      anemia        History reviewed. No pertinent past surgical history. Social History   Substance Use Topics    Smoking status: Never Smoker    Smokeless tobacco: Not on file    Alcohol use Yes      Comment: rare        Family History   Problem Relation Age of Onset    Diabetes Mother     Stroke Mother         No Known Allergies     Prior to Admission medications    Medication Sig Start Date End Date Taking? Authorizing Provider   ibuprofen (MOTRIN) 600 mg tablet Take 600 mg by mouth every eight (8) hours as needed for Pain. Yes Historical Provider   aspirin 81 mg chewable tablet Take 324 mg by mouth daily as needed for Pain.    Yes Historical Provider         Review of Systems:  (bold if positive, if negative)    Gen:  Eyes:  ENT:  CVS:  Pulm:  dyspneaGI:    :    MS:  Skin:  Psych:  Endo:    Hem:  Renal: Neuro:            Objective:      VITALS:    Vital signs reviewed; most recent are:    Visit Vitals    /73 (BP 1 Location: Left arm, BP Patient Position: At rest)    Pulse 90    Temp 98.1 °F (36.7 °C)    Resp 17    Ht 5' 3\" (1.6 m)    Wt 59 kg (130 lb)    SpO2 100%    BMI 23.03 kg/m2     SpO2 Readings from Last 6 Encounters:   01/26/17 100%        No intake or output data in the 24 hours ending 01/26/17 3311         Exam:     Physical Exam:    Gen:  Well-developed, well-nourished, in no acute distress  HEENT:  pale conjunctivae, PERRL, hearing intact to voice, moist mucous membranes  Neck:  Supple, without masses, thyroid non-tender  Resp:  No accessory muscle use, clear breath sounds without wheezes rales or rhonchi  Card:  Grade 2/6 systolic murmur, normal S1, S2 without thrills, bruits or peripheral edema  Abd:  Soft, non-tender, non-distended, normoactive bowel sounds are present, no palpable organomegaly and no detectable hernias  Lymph:  No cervical or inguinal adenopathy  Musc:  No cyanosis or clubbing  Skin:  No rashes or ulcers, skin turgor is good  Neuro:  Cranial nerves are grossly intact, no focal motor weakness, follows commands appropriately  Psych:  Good insight, oriented to person, place and time, alert       Labs:    Recent Labs      01/26/17 2217   WBC  9.0   HGB  5.8*   HCT  19.9*   PLT  236     Recent Labs      01/26/17 2217   NA  138   K  3.7   CL  104   CO2  24   GLU  100   BUN  13   CREA  0.75   CA  8.2*   ALB  3.8   TBILI  0.2   SGOT  13*   ALT  31     No results found for: GLUCPOC  No results for input(s): PH, PCO2, PO2, HCO3, FIO2 in the last 72 hours. Recent Labs      01/26/17 2217   INR  1.1       Telemetry reviewed:         Assessment/Plan:    1. Microcytic anemia (1/26/2017). Symptomatic. This is likely secondary to heavy menstruation. Admit to remote tele under observation. Will be transfused 3 units of PRBC. Needs to take her iron supplement.      2. Menometrorrhagia (1/26/2017)/ DUB. Pt has outpatient GYN. She refused her to be seen by GYN and wants to FU with GYN soon. Advised her to FU with them and continue taking her iron supplement. 3.  Syncope (1/26/2017)/ dizziness/ lightheadedness. This is likely secondary to severe anemia. Continue to monitor on remote telemetry.       signed out to Dr Louis Jameson at 07:00    Previous medical records reviewed     Risk of deterioration: medium      Total time spent with patient: 48 895 [address] discussed with: Patient, Family, Nursing Staff and >50% of time spent in counseling and coordination of care    Discussed:  Care Plan    Prophylaxis:  SCD's    Probable Disposition:  Home w/Family           ___________________________________________________    Attending Physician: Bethany Nageotte, MD

## 2017-01-27 NOTE — ROUTINE PROCESS
TRANSFER - IN REPORT:    Verbal report received from viki fung on [facility]  being received from [facility] (unit) for routine progression of care      Report consisted of patients Situation, Background, Assessment and   Recommendations(SBAR). Information from the following report(s) SBAR, ED Summary and MAR was reviewed with the receiving nurse. Opportunity for questions and clarification was provided. Assessment completed upon patients arrival to unit and care assumed.

## 2017-01-27 NOTE — PROGRESS NOTES
1/27/2017 10:49 AM Met with pt. Obs letter given and explained. Charted address and phone number confirmed. Pt will have her  at home after discharge to assist. Pt has rx coverage and fills her scripts at [facility]. Pt's  will transport pt home. No discharge needs identified. CM will follow. BRIAN Ruiz   Care Management Interventions  PCP Verified by CM: Yes Cam Moses )  Mode of Transport at Discharge:  Other (see comment) ( )  Current Support Network: Lives with Spouse, Own Home  Confirm Follow Up Transport: Self

## 2017-01-27 NOTE — PROGRESS NOTES
Letter of Status Determination: Upgrade from Observation to Inpatient Status           Pt Name:  Nita Roberts   MR#  [id number]   [facility]#   [id number]   [address] [place] [address]  414/01  @ [place] date  1/26/2017  9:42 PM   Current Attending Physician  Daysi Stevenson MD   Insurance  Payor: [employer] / Plan: [employer] [id number] PPO / Product Type: PPO /       Principal diagnosis  <principal problem not specified>   Microcytic anemia    Clinicals  42 y.o. y.o  female hospitalized with above diagnosis. She is noted to have presented to the ER on 01/26/17 with complaint of syncope. She also reports moderate to severe bleeding during menstrual cycles. Her hgb in the ER noted at 5.8    Total THREE units PRBC transfusion planned for this admission. [place] MCG criteria   Does apply  Anemia, Iron Deficiency or Unspecified  ORG: M-35 (ISC)   STATUS DETERMINATION  Based on documented presenting clinical data, comorbid conditions, high risk of adverse events and deterioration, it is our judgment that the patient's status should be upgraded from OBSERVATION to INPATIENT status.          Additional comments         Paz Guerrero MD MPH FACP     Physician Advisor    [address]     President Medical Staff, [address] [place] [address]    Cell  [phone]

## 2017-01-28 LAB
ABO + RH BLD: NORMAL
BLD PROD TYP BPU: NORMAL
BLOOD GROUP ANTIBODIES SERPL: NORMAL
BPU ID: NORMAL
CROSSMATCH RESULT,%XM: NORMAL
SPECIMEN EXP DATE BLD: NORMAL
STATUS OF UNIT,%ST: NORMAL
UNIT DIVISION, %UDIV: 0

## 2017-01-31 NOTE — PROGRESS NOTES
Dear Dr. Tameka Hernandez : The insurance company has denied the inpatient status of one of your patients. Now we need you to complete a peer to peer review with [facility] doctor and justify your clinical decision. Only you are allowed to do this peer to peer review. You will need to do the following:   Within next FIVE days   Please call [address] [place] [address] at  [phone], then follow the prompt for medical management and then choose peer to peer review. Talk to their staff and setup a peer to peer review with their doctor at a mutually convenient time   Prepare your argument to defend your decision on the basis of criteria I have provided for you - see below - next to [facility] (criteria)   Complete review with him/her   Send an email reply to me with   Name of doctor you did the peer to peer   Outcome   Date you did the review   If you need help, you are more than welcome to talk to me anytime, call me on my cell number listed below     Pt Name:  Fidelina Coy   MR#  [id number]   CSN#   [id number]   [address] date  1/26/2017  9:42 PM   Discharge date  1/27/2017   Discharging doctor  Eli Hightower   Admitting doctor Melissa Iglesias   Principal diagnosis  <principal problem not specified>   Anemia        Insurance  Payor: [employer] / Plan: [employer] [id number] PPO / Product Type: PPO /      Clinicals    42 y.o. lady who was admitted with anemia and past medical history of metrorrhagia. She presented to ER with history of passing out, lightheadedness, dizziness. She was noted to have presenting hgb of 5.8 and she was transfused THREE units PRBC during this admit with significant improvement in her hgb at the time of discharge.       [facility]    Anemia, Iron Deficiency or Unspecified ORG: M-35 (ISC)    Inpatient admission required[B] rather than observation care (see Anemia, Iron Deficiency or Unspecified: Observation Careclick here to preview Anemia, Iron Deficiency or Unspecified: Observation Care ISC guideline as appropriate) because of 1 or more of the following(9)(10):  · Hemodynamic instability- syncope in this case with persistent lightheadedness and dizziness. · Active bleeding that cannot be rapidly controlled  · Cardiovascular symptoms (ie, dyspnea, chest pain, heart failure) that are severe or persistent  · Neurologic symptoms (ie, Altered mental status that is severe or persistent, recurrent syncope or near syncope)  · Cardiac arrhythmias of immediate concern  · Acute peripheral ischemia (eg, pulseless, cool, mottled, or cyanotic extremity)  · High-risk low platelet count  · Acute renal failure  · Ongoing transfusion for blood loss (eg, greater than 2 units). Metrorrhagia with severe anemia. Decision Dr. Chandler Johnson to complete a peer to peer review.     Additional comments         Sincerely   Jose Richardson MD MPH FACP   Physician Adviser, Shawanda Barry, [facility]   Cell: [phone]

## 2017-07-04 ENCOUNTER — HOSPITAL ENCOUNTER (EMERGENCY)
Age: 47
Discharge: HOME | End: 2017-07-04
Attending: EMERGENCY MEDICINE
Payer: COMMERCIAL

## 2017-07-04 VITALS
RESPIRATION RATE: 16 BRPM | HEART RATE: 81 BPM | DIASTOLIC BLOOD PRESSURE: 84 MMHG | WEIGHT: 126 LBS | SYSTOLIC BLOOD PRESSURE: 159 MMHG | OXYGEN SATURATION: 100 % | BODY MASS INDEX: 21.51 KG/M2 | HEIGHT: 64 IN
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
## 2017-07-04 PROCEDURE — 99281 EMR DPT VST MAYX REQ PHY/QHP: CPT

## 2017-07-04 RX ORDER — IBUPROFEN 400 MG/1
400 TABLET ORAL
Status: DISCONTINUED | OUTPATIENT
Start: 2017-07-04 | End: 2017-07-04 | Stop reason: HOSPADM

## 2017-07-04 NOTE — ED NOTES
Patient left prior to medication administration. Patient not seen by this nurse.   Seen and discharged by MD.
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
## 2022-03-18 PROBLEM — D50.9 MICROCYTIC ANEMIA: Status: ACTIVE | Noted: 2017-01-26

## 2022-03-18 PROBLEM — N92.1 METRORRHAGIA: Status: ACTIVE | Noted: 2017-01-26

## 2022-03-18 PROBLEM — R55 SYNCOPE: Status: ACTIVE | Noted: 2017-01-26

## 2022-03-19 PROBLEM — D64.9 ANEMIA: Status: ACTIVE | Noted: 2017-01-27

## 2024-01-28 ENCOUNTER — HOSPITAL ENCOUNTER (EMERGENCY)
Facility: HOSPITAL | Age: 54
Discharge: HOME | End: 2024-01-28
Attending: STUDENT IN AN ORGANIZED HEALTH CARE EDUCATION/TRAINING PROGRAM | Admitting: STUDENT IN AN ORGANIZED HEALTH CARE EDUCATION/TRAINING PROGRAM
Payer: COMMERCIAL

## 2024-01-28 ENCOUNTER — APPOINTMENT (OUTPATIENT)
Facility: HOSPITAL | Age: 54
End: 2024-01-28
Payer: COMMERCIAL

## 2024-01-28 VITALS
WEIGHT: 130 LBS | SYSTOLIC BLOOD PRESSURE: 165 MMHG | RESPIRATION RATE: 16 BRPM | HEIGHT: 64 IN | OXYGEN SATURATION: 100 % | HEART RATE: 89 BPM | DIASTOLIC BLOOD PRESSURE: 98 MMHG | TEMPERATURE: 98 F | BODY MASS INDEX: 22.2 KG/M2

## 2024-01-28 DIAGNOSIS — K52.9 COLITIS: ICD-10-CM

## 2024-01-28 DIAGNOSIS — K62.5 RECTAL BLEEDING: Primary | ICD-10-CM

## 2024-01-28 LAB
ABO + RH BLD: NORMAL
ALBUMIN SERPL-MCNC: 3.8 G/DL (ref 3.5–5)
ALBUMIN/GLOB SERPL: 1 (ref 1.1–2.2)
ALP SERPL-CCNC: 70 U/L (ref 45–117)
ALT SERPL-CCNC: 24 U/L (ref 12–78)
ANION GAP SERPL CALC-SCNC: 4 MMOL/L (ref 5–15)
APPEARANCE UR: CLEAR
AST SERPL-CCNC: 10 U/L (ref 15–37)
BACTERIA URNS QL MICRO: ABNORMAL /HPF
BASOPHILS # BLD: 0 K/UL (ref 0–0.1)
BASOPHILS NFR BLD: 0 % (ref 0–1)
BILIRUB SERPL-MCNC: 0.6 MG/DL (ref 0.2–1)
BILIRUB UR QL: NEGATIVE
BLOOD GROUP ANTIBODIES SERPL: NORMAL
BUN SERPL-MCNC: 10 MG/DL (ref 6–20)
BUN/CREAT SERPL: 13 (ref 12–20)
CALCIUM SERPL-MCNC: 9.3 MG/DL (ref 8.5–10.1)
CHLORIDE SERPL-SCNC: 108 MMOL/L (ref 97–108)
CO2 SERPL-SCNC: 29 MMOL/L (ref 21–32)
COLOR UR: ABNORMAL
COMMENT:: NORMAL
CREAT SERPL-MCNC: 0.76 MG/DL (ref 0.55–1.02)
DIFFERENTIAL METHOD BLD: ABNORMAL
EOSINOPHIL # BLD: 0 K/UL (ref 0–0.4)
EOSINOPHIL NFR BLD: 0 % (ref 0–7)
EPITH CASTS URNS QL MICRO: ABNORMAL /LPF
ERYTHROCYTE [DISTWIDTH] IN BLOOD BY AUTOMATED COUNT: 11.6 % (ref 11.5–14.5)
GLOBULIN SER CALC-MCNC: 3.9 G/DL (ref 2–4)
GLUCOSE SERPL-MCNC: 113 MG/DL (ref 65–100)
GLUCOSE UR STRIP.AUTO-MCNC: NEGATIVE MG/DL
HCT VFR BLD AUTO: 31.2 % (ref 35–47)
HCT VFR BLD AUTO: 33.4 % (ref 35–47)
HEMOCCULT STL QL: POSITIVE
HGB BLD-MCNC: 11 G/DL (ref 11.5–16)
HGB BLD-MCNC: 11.7 G/DL (ref 11.5–16)
HGB UR QL STRIP: NEGATIVE
IMM GRANULOCYTES # BLD AUTO: 0 K/UL (ref 0–0.04)
IMM GRANULOCYTES NFR BLD AUTO: 0 % (ref 0–0.5)
KETONES UR QL STRIP.AUTO: NEGATIVE MG/DL
LEUKOCYTE ESTERASE UR QL STRIP.AUTO: ABNORMAL
LYMPHOCYTES # BLD: 1.8 K/UL (ref 0.8–3.5)
LYMPHOCYTES NFR BLD: 20 % (ref 12–49)
MCH RBC QN AUTO: 30.2 PG (ref 26–34)
MCHC RBC AUTO-ENTMCNC: 35 G/DL (ref 30–36.5)
MCV RBC AUTO: 86.3 FL (ref 80–99)
MONOCYTES # BLD: 0.4 K/UL (ref 0–1)
MONOCYTES NFR BLD: 5 % (ref 5–13)
NEUTS SEG # BLD: 6.7 K/UL (ref 1.8–8)
NEUTS SEG NFR BLD: 75 % (ref 32–75)
NITRITE UR QL STRIP.AUTO: NEGATIVE
NRBC # BLD: 0 K/UL (ref 0–0.01)
NRBC BLD-RTO: 0 PER 100 WBC
PH UR STRIP: 5 (ref 5–8)
PLATELET # BLD AUTO: 223 K/UL (ref 150–400)
PMV BLD AUTO: 12.3 FL (ref 8.9–12.9)
POTASSIUM SERPL-SCNC: 3.2 MMOL/L (ref 3.5–5.1)
PROT SERPL-MCNC: 7.7 G/DL (ref 6.4–8.2)
PROT UR STRIP-MCNC: NEGATIVE MG/DL
RBC # BLD AUTO: 3.87 M/UL (ref 3.8–5.2)
RBC #/AREA URNS HPF: ABNORMAL /HPF (ref 0–5)
SODIUM SERPL-SCNC: 141 MMOL/L (ref 136–145)
SP GR UR REFRACTOMETRY: 1.02 (ref 1–1.03)
SPECIMEN EXP DATE BLD: NORMAL
SPECIMEN HOLD: NORMAL
UROBILINOGEN UR QL STRIP.AUTO: 0.2 EU/DL (ref 0.2–1)
WBC # BLD AUTO: 8.9 K/UL (ref 3.6–11)
WBC URNS QL MICRO: ABNORMAL /HPF (ref 0–4)

## 2024-01-28 PROCEDURE — 81001 URINALYSIS AUTO W/SCOPE: CPT

## 2024-01-28 PROCEDURE — 96375 TX/PRO/DX INJ NEW DRUG ADDON: CPT

## 2024-01-28 PROCEDURE — 36415 COLL VENOUS BLD VENIPUNCTURE: CPT

## 2024-01-28 PROCEDURE — 96374 THER/PROPH/DIAG INJ IV PUSH: CPT

## 2024-01-28 PROCEDURE — 85018 HEMOGLOBIN: CPT

## 2024-01-28 PROCEDURE — 6370000000 HC RX 637 (ALT 250 FOR IP)

## 2024-01-28 PROCEDURE — 99285 EMERGENCY DEPT VISIT HI MDM: CPT

## 2024-01-28 PROCEDURE — 74174 CTA ABD&PLVS W/CONTRAST: CPT

## 2024-01-28 PROCEDURE — 80053 COMPREHEN METABOLIC PANEL: CPT

## 2024-01-28 PROCEDURE — 6360000004 HC RX CONTRAST MEDICATION: Performed by: STUDENT IN AN ORGANIZED HEALTH CARE EDUCATION/TRAINING PROGRAM

## 2024-01-28 PROCEDURE — 86900 BLOOD TYPING SEROLOGIC ABO: CPT

## 2024-01-28 PROCEDURE — 82272 OCCULT BLD FECES 1-3 TESTS: CPT

## 2024-01-28 PROCEDURE — 6360000002 HC RX W HCPCS

## 2024-01-28 PROCEDURE — 85014 HEMATOCRIT: CPT

## 2024-01-28 PROCEDURE — 86850 RBC ANTIBODY SCREEN: CPT

## 2024-01-28 PROCEDURE — 86901 BLOOD TYPING SEROLOGIC RH(D): CPT

## 2024-01-28 PROCEDURE — 85025 COMPLETE CBC W/AUTO DIFF WBC: CPT

## 2024-01-28 RX ORDER — ACETAMINOPHEN 500 MG
1000 TABLET ORAL
Qty: 60 TABLET | Refills: 0 | Status: SHIPPED | OUTPATIENT
Start: 2024-01-28 | End: 2024-02-27

## 2024-01-28 RX ORDER — POTASSIUM CHLORIDE 750 MG/1
40 TABLET, FILM COATED, EXTENDED RELEASE ORAL ONCE
Status: COMPLETED | OUTPATIENT
Start: 2024-01-28 | End: 2024-01-28

## 2024-01-28 RX ORDER — MORPHINE SULFATE 4 MG/ML
4 INJECTION, SOLUTION INTRAMUSCULAR; INTRAVENOUS
Status: COMPLETED | OUTPATIENT
Start: 2024-01-28 | End: 2024-01-28

## 2024-01-28 RX ORDER — ONDANSETRON 4 MG/1
4 TABLET, FILM COATED ORAL EVERY 8 HOURS PRN
Qty: 15 TABLET | Refills: 0 | Status: SHIPPED | OUTPATIENT
Start: 2024-01-28

## 2024-01-28 RX ORDER — DICYCLOMINE HYDROCHLORIDE 10 MG/1
10 CAPSULE ORAL
Qty: 15 CAPSULE | Refills: 0 | Status: SHIPPED | OUTPATIENT
Start: 2024-01-28

## 2024-01-28 RX ORDER — METRONIDAZOLE 500 MG/1
500 TABLET ORAL
Status: COMPLETED | OUTPATIENT
Start: 2024-01-28 | End: 2024-01-28

## 2024-01-28 RX ORDER — ONDANSETRON 2 MG/ML
4 INJECTION INTRAMUSCULAR; INTRAVENOUS ONCE
Status: COMPLETED | OUTPATIENT
Start: 2024-01-28 | End: 2024-01-28

## 2024-01-28 RX ADMIN — METRONIDAZOLE 500 MG: 500 TABLET ORAL at 17:08

## 2024-01-28 RX ADMIN — POTASSIUM CHLORIDE 40 MEQ: 750 TABLET, FILM COATED, EXTENDED RELEASE ORAL at 17:08

## 2024-01-28 RX ADMIN — IOPAMIDOL 100 ML: 755 INJECTION, SOLUTION INTRAVENOUS at 14:00

## 2024-01-28 RX ADMIN — ONDANSETRON 4 MG: 2 INJECTION INTRAMUSCULAR; INTRAVENOUS at 12:05

## 2024-01-28 RX ADMIN — MORPHINE SULFATE 4 MG: 4 INJECTION, SOLUTION INTRAMUSCULAR; INTRAVENOUS at 12:05

## 2024-01-28 ASSESSMENT — PAIN DESCRIPTION - LOCATION
LOCATION: ABDOMEN
LOCATION: ABDOMEN

## 2024-01-28 ASSESSMENT — PAIN SCALES - GENERAL
PAINLEVEL_OUTOF10: 5
PAINLEVEL_OUTOF10: 10

## 2024-01-28 ASSESSMENT — PAIN DESCRIPTION - DESCRIPTORS: DESCRIPTORS: CRAMPING

## 2024-01-28 ASSESSMENT — PAIN - FUNCTIONAL ASSESSMENT: PAIN_FUNCTIONAL_ASSESSMENT: 0-10

## 2024-01-28 NOTE — ED PROVIDER NOTES
findings as above. Workup included CBC, CMP, UA, Hemoccult, CTAP, type and screen. Workup remarkable for mild hypokalemia, fecal occult positive. Hgb stable. Imaging concerning for colitis.  Gave morphine, Zofran. Upon re-evaluation, patient feeling okay. Repeat hemoglobin stable. Gave Flagyl as patient was due for it during her ER visit today.  Gave K-Chlor for potassium 3.2. Presentation and/or workup not consistent with acute abdomen, appendicitis, pancreatitis, bowel obstruction, bowel perforation, urinary tract infection, diverticulitis, acute hepatobiliary disease. Discharged with Bentyl, tylenol, zofran, GI referral PRN, PCP follow up, strict return precautions.  Patient taking cipro and flagyl from urgent care.       Amount and/or Complexity of Data Reviewed  Labs: ordered. Decision-making details documented in ED Course.  Radiology: ordered. Decision-making details documented in ED Course.    Risk  OTC drugs.  Prescription drug management.            REASSESSMENT     ED Course as of 01/28/24 1718   Sun Jan 28, 2024   1216 POC Occult Blood, Fecal(!): Positive [MM]   1216 Hemoglobin Quant: 11.7 [MM]      ED Course User Index  [MM] Ella Bacon, WILLIAM           CONSULTS:  None    PROCEDURES:  Unless otherwise noted below, none     Procedures      FINAL IMPRESSION      1. Rectal bleeding    2. Colitis          DISPOSITION/PLAN   DISPOSITION Discharge - Pending Orders Complete 01/28/2024 04:55:12 PM      PATIENT REFERRED TO:  [facility] EMERGENCY DEPT  [address] [facility] [place] [address] [phone]  Go to   As needed, If symptoms worsen    Luke Faye  [address] [facility] [address] [phone]    Schedule an appointment as soon as possible for a visit       [facility]  [address] [facility] [address] [phone]  Schedule an appointment as soon as possible for a visit         DISCHARGE MEDICATIONS:  New Prescriptions    ACETAMINOPHEN (TYLENOL)

## 2024-01-28 NOTE — ED TRIAGE NOTES
Pt ambulatory to ED c/o increased bright red rectal bleeding x 4 days. Endorses generalized abdominal cramping. Denies N/V.    States she was seen at [facility] 2 days ago and Prescribed Flagyl and Cipro. States she has been taking x 24 hours with worsening of symptoms.

## 2024-01-28 NOTE — DISCHARGE INSTRUCTIONS
As discussed, your workup today shows colitis. Continue antibiotics prescribed by [facility]. Follow up with your PCP for further management. Return to the ER if you experience severe or worsening symptoms.